# Patient Record
Sex: MALE | Race: WHITE | NOT HISPANIC OR LATINO | Employment: OTHER | URBAN - METROPOLITAN AREA
[De-identification: names, ages, dates, MRNs, and addresses within clinical notes are randomized per-mention and may not be internally consistent; named-entity substitution may affect disease eponyms.]

---

## 2022-11-22 ENCOUNTER — HOSPITAL ENCOUNTER (EMERGENCY)
Facility: CLINIC | Age: 67
Discharge: HOME | End: 2022-11-22
Attending: EMERGENCY MEDICINE | Admitting: EMERGENCY MEDICINE
Payer: MEDICARE

## 2022-11-22 ENCOUNTER — APPOINTMENT (OUTPATIENT)
Dept: GENERAL RADIOLOGY | Facility: CLINIC | Age: 67
End: 2022-11-22
Attending: EMERGENCY MEDICINE
Payer: MEDICARE

## 2022-11-22 VITALS
BODY MASS INDEX: 23.7 KG/M2 | TEMPERATURE: 98.8 F | SYSTOLIC BLOOD PRESSURE: 154 MMHG | HEART RATE: 97 BPM | OXYGEN SATURATION: 99 % | RESPIRATION RATE: 22 BRPM | HEIGHT: 72 IN | WEIGHT: 175 LBS | DIASTOLIC BLOOD PRESSURE: 84 MMHG

## 2022-11-22 DIAGNOSIS — S29.019A ACUTE THORACIC MYOFASCIAL STRAIN, INITIAL ENCOUNTER: ICD-10-CM

## 2022-11-22 DIAGNOSIS — D86.9 SARCOIDOSIS: ICD-10-CM

## 2022-11-22 DIAGNOSIS — J20.9 ACUTE BRONCHITIS, UNSPECIFIED ORGANISM: ICD-10-CM

## 2022-11-22 PROBLEM — J84.9 INTERSTITIAL LUNG DISEASE (H): Status: ACTIVE | Noted: 2019-05-03

## 2022-11-22 LAB
ALBUMIN SERPL-MCNC: 3.4 G/DL (ref 3.4–5)
ALP SERPL-CCNC: 64 U/L (ref 40–150)
ALT SERPL W P-5'-P-CCNC: 22 U/L (ref 0–70)
ANION GAP SERPL CALCULATED.3IONS-SCNC: 4 MMOL/L (ref 3–14)
AST SERPL W P-5'-P-CCNC: 20 U/L (ref 0–45)
BASOPHILS # BLD AUTO: 0 10E3/UL (ref 0–0.2)
BASOPHILS NFR BLD AUTO: 0 %
BILIRUB SERPL-MCNC: 0.6 MG/DL (ref 0.2–1.3)
BUN SERPL-MCNC: 28 MG/DL (ref 7–30)
CALCIUM SERPL-MCNC: 9.8 MG/DL (ref 8.5–10.1)
CHLORIDE BLD-SCNC: 100 MMOL/L (ref 94–109)
CO2 SERPL-SCNC: 28 MMOL/L (ref 20–32)
CREAT SERPL-MCNC: 0.9 MG/DL (ref 0.66–1.25)
D DIMER PPP FEU-MCNC: 0.5 UG/ML FEU (ref 0–0.5)
EOSINOPHIL # BLD AUTO: 0 10E3/UL (ref 0–0.7)
EOSINOPHIL NFR BLD AUTO: 0 %
ERYTHROCYTE [DISTWIDTH] IN BLOOD BY AUTOMATED COUNT: 12.8 % (ref 10–15)
FLUAV RNA SPEC QL NAA+PROBE: NEGATIVE
FLUBV RNA RESP QL NAA+PROBE: NEGATIVE
GFR SERPL CREATININE-BSD FRML MDRD: >90 ML/MIN/1.73M2
GLUCOSE BLD-MCNC: 111 MG/DL (ref 70–99)
HCT VFR BLD AUTO: 41.9 % (ref 40–53)
HGB BLD-MCNC: 13.8 G/DL (ref 13.3–17.7)
HOLD SPECIMEN: NORMAL
IMM GRANULOCYTES # BLD: 0.1 10E3/UL
IMM GRANULOCYTES NFR BLD: 1 %
LACTATE SERPL-SCNC: 0.6 MMOL/L (ref 0.7–2)
LYMPHOCYTES # BLD AUTO: 0.6 10E3/UL (ref 0.8–5.3)
LYMPHOCYTES NFR BLD AUTO: 6 %
MCH RBC QN AUTO: 28.4 PG (ref 26.5–33)
MCHC RBC AUTO-ENTMCNC: 32.9 G/DL (ref 31.5–36.5)
MCV RBC AUTO: 86 FL (ref 78–100)
MONOCYTES # BLD AUTO: 0.8 10E3/UL (ref 0–1.3)
MONOCYTES NFR BLD AUTO: 8 %
NEUTROPHILS # BLD AUTO: 8.1 10E3/UL (ref 1.6–8.3)
NEUTROPHILS NFR BLD AUTO: 85 %
NRBC # BLD AUTO: 0 10E3/UL
NRBC BLD AUTO-RTO: 0 /100
PLATELET # BLD AUTO: 267 10E3/UL (ref 150–450)
POTASSIUM BLD-SCNC: 4.1 MMOL/L (ref 3.4–5.3)
PROCALCITONIN SERPL-MCNC: <0.05 NG/ML
PROT SERPL-MCNC: 8.2 G/DL (ref 6.8–8.8)
RBC # BLD AUTO: 4.86 10E6/UL (ref 4.4–5.9)
RSV RNA SPEC NAA+PROBE: NEGATIVE
SARS-COV-2 RNA RESP QL NAA+PROBE: NEGATIVE
SODIUM SERPL-SCNC: 132 MMOL/L (ref 133–144)
WBC # BLD AUTO: 9.6 10E3/UL (ref 4–11)

## 2022-11-22 PROCEDURE — 71046 X-RAY EXAM CHEST 2 VIEWS: CPT

## 2022-11-22 PROCEDURE — 36415 COLL VENOUS BLD VENIPUNCTURE: CPT | Performed by: EMERGENCY MEDICINE

## 2022-11-22 PROCEDURE — 87637 SARSCOV2&INF A&B&RSV AMP PRB: CPT | Performed by: EMERGENCY MEDICINE

## 2022-11-22 PROCEDURE — 99284 EMERGENCY DEPT VISIT MOD MDM: CPT | Mod: CS,25

## 2022-11-22 PROCEDURE — 85025 COMPLETE CBC W/AUTO DIFF WBC: CPT | Performed by: EMERGENCY MEDICINE

## 2022-11-22 PROCEDURE — 94640 AIRWAY INHALATION TREATMENT: CPT

## 2022-11-22 PROCEDURE — 83605 ASSAY OF LACTIC ACID: CPT | Performed by: EMERGENCY MEDICINE

## 2022-11-22 PROCEDURE — 250N000012 HC RX MED GY IP 250 OP 636 PS 637: Performed by: EMERGENCY MEDICINE

## 2022-11-22 PROCEDURE — 84145 PROCALCITONIN (PCT): CPT | Performed by: EMERGENCY MEDICINE

## 2022-11-22 PROCEDURE — 250N000009 HC RX 250: Performed by: EMERGENCY MEDICINE

## 2022-11-22 PROCEDURE — C9803 HOPD COVID-19 SPEC COLLECT: HCPCS

## 2022-11-22 PROCEDURE — 80053 COMPREHEN METABOLIC PANEL: CPT | Performed by: EMERGENCY MEDICINE

## 2022-11-22 PROCEDURE — 85379 FIBRIN DEGRADATION QUANT: CPT | Performed by: EMERGENCY MEDICINE

## 2022-11-22 RX ORDER — PREDNISONE 20 MG/1
40 TABLET ORAL ONCE
Status: COMPLETED | OUTPATIENT
Start: 2022-11-22 | End: 2022-11-22

## 2022-11-22 RX ORDER — CEPHALEXIN 500 MG/1
500 CAPSULE ORAL 4 TIMES DAILY
Qty: 40 CAPSULE | Refills: 0 | Status: SHIPPED | OUTPATIENT
Start: 2022-11-22 | End: 2022-12-02

## 2022-11-22 RX ORDER — FLUTICASONE FUROATE AND VILANTEROL 200; 25 UG/1; UG/1
1 POWDER RESPIRATORY (INHALATION) DAILY
COMMUNITY
Start: 2022-09-21

## 2022-11-22 RX ORDER — ATORVASTATIN CALCIUM 20 MG/1
20 TABLET, FILM COATED ORAL DAILY
COMMUNITY
Start: 2022-04-12 | End: 2023-04-12

## 2022-11-22 RX ORDER — HYDROXYCHLOROQUINE SULFATE 200 MG/1
200 TABLET, FILM COATED ORAL
COMMUNITY
Start: 2022-04-01

## 2022-11-22 RX ORDER — TACROLIMUS 1 MG/G
OINTMENT TOPICAL 2 TIMES DAILY
COMMUNITY
Start: 2021-04-27

## 2022-11-22 RX ORDER — PREDNISONE 20 MG/1
TABLET ORAL
Qty: 10 TABLET | Refills: 0 | Status: SHIPPED | OUTPATIENT
Start: 2022-11-22

## 2022-11-22 RX ORDER — FOLIC ACID 1 MG/1
1 TABLET ORAL
COMMUNITY
Start: 2021-05-12

## 2022-11-22 RX ORDER — IPRATROPIUM BROMIDE AND ALBUTEROL SULFATE 2.5; .5 MG/3ML; MG/3ML
3 SOLUTION RESPIRATORY (INHALATION) ONCE
Status: COMPLETED | OUTPATIENT
Start: 2022-11-22 | End: 2022-11-22

## 2022-11-22 RX ADMIN — PREDNISONE 40 MG: 20 TABLET ORAL at 21:36

## 2022-11-22 RX ADMIN — IPRATROPIUM BROMIDE AND ALBUTEROL SULFATE 3 ML: .5; 3 SOLUTION RESPIRATORY (INHALATION) at 21:37

## 2022-11-22 ASSESSMENT — ENCOUNTER SYMPTOMS
FATIGUE: 1
CHILLS: 1
NAUSEA: 0
APPETITE CHANGE: 1
FEVER: 1
ABDOMINAL PAIN: 0
COUGH: 1
SHORTNESS OF BREATH: 1
DIARRHEA: 0
VOMITING: 0
MYALGIAS: 1

## 2022-11-22 ASSESSMENT — ACTIVITIES OF DAILY LIVING (ADL)
ADLS_ACUITY_SCORE: 35
ADLS_ACUITY_SCORE: 35

## 2022-11-23 NOTE — DISCHARGE INSTRUCTIONS
Prednisone for 5 more days, push fluids, rest.  Keflex 4 times a day for 10 days.  Contact your pulmonary doctor tomorrow and schedule appointment within the next week for a recheck.  Talk to your regular doctor about the possibility that you may have an underlying penicillin allergy.  If you get significantly worse at any time with your oxygen level dropping and worsening shortness of breath and fevers, return to the emergency room.  Ice or heat to your back, consider seeing a chiropractor for the pain.  You could do massage as well.

## 2022-11-23 NOTE — ED PROVIDER NOTES
History   Chief Complaint:  Shortness of Breath    The history is provided by the patient.      Nicho Amezcua is a 67 year old male with history of sarcoidosis, emphysema, and hyperlipidemia who presents with shortness of breath. The patient reports onset of cough, shortness of breath, chills, fever, myalgia, fatigue, and loss of appetite 9 days ago. Adds that she has had pain in her right upper back for one week. Notes that his fevers peaked at 102 F and that he occasionally produces a white, chunky phlegm with is cough. Adds that he has to take breaks going up a flight of stairs. Denies use of at home oxygen or an albuterol inhaler. Of note, he reports his at home oxygen levels have hit the low 90's throughout the past week. Denies nausea, vomiting, diarrhea, abdominal pain, chest pain, and pain with respiration. Denies history of blood clot.    Review of Systems   Constitutional: Positive for appetite change, chills, fatigue and fever.   Respiratory: Positive for cough and shortness of breath.    Cardiovascular: Negative for chest pain.   Gastrointestinal: Negative for abdominal pain, diarrhea, nausea and vomiting.   Musculoskeletal: Positive for myalgias.   All other systems reviewed and are negative.      Allergies:  Sulfa Drugs  Tetracycline    Medications:  Atorvastatin  Fluticasone  Hydrochloroquine    Methotrexate     Past Medical History:     Interstitial lung disease  ADD  Hernia, inguinal  Hyperlipidemia  Sarcoidosis  Subclinical hypothyroidism  Wrist fracture  Erectile dysfunction  Emphysema     Past Surgical History:    Repair inguinal hernia, bilateral    Social History:  Presents alone  Presents via private vehicle     Physical Exam     Patient Vitals for the past 24 hrs:   BP Temp Temp src Pulse Resp SpO2 Height Weight   11/22/22 2010 (!) 154/84 -- -- 97 -- -- -- --   11/22/22 2007 -- 98.8  F (37.1  C) Oral -- 22 99 % 1.829 m (6') 79.4 kg (175 lb)     Physical Exam  Nursing note and vitals  reviewed.    Constitutional:  Appears comfortable.    HENT:                Nose normal.  No discharge.      Oral mucosa is moist.  Eyes:    Conjunctivae are normal without injection.  Pupils are equal.  Cardiovascular:  Normal rate, regular rhythm with normal S1 and S2.      Normal heart sounds and peripheral pulses 2+ and equal.       No murmur or amrita.  Pulmonary:  Respiratory effort increased slightly. Few coarse bronchi bilaterally.    No wheezing noted, he coughs a lot when he takes a deep breath.  GI:    Soft. No distension and no mass. No tenderness.      No flank pain.   Musculoskeletal: Some tenderness to the right thoracic spine on palpation over the rhomboid muscle.  No midline spinal tenderness.  Skin:    Skin is warm and dry. No rash noted.   Psychiatric:   Behavior is normal. Appropriate mood and affect.     Judgment and thought content normal.    Emergency Department Course   Imaging:  Chest XR,  PA & LAT   Final Result   IMPRESSION: There is bilateral hilar prominence, which may reflect lymphadenopathy in the setting of reported sarcoidosis. Bilateral perihilar interstitial opacities are likely related.      Within the left lower lobe, there is a more solid region of airspace opacity, measuring up to 6.5 cm, which could be associated patients known sarcoidosis, though a primary pulmonary malignancy or acute airspace consolidation cannot be excluded.    Correlation with CT imaging is recommended.      No pleural effusion or pneumothorax.       Cardiomediastinal silhouette is normal.         ATTENTION: ABNORMAL REPORT      THE DICTATION ABOVE DESCRIBES AN ABNORMALITY FOR WHICH FOLLOWUP IS NEEDED.        Report per radiology    Laboratory:  Labs Ordered and Resulted from Time of ED Arrival to Time of ED Departure   COMPREHENSIVE METABOLIC PANEL - Abnormal       Result Value    Sodium 132 (*)     Potassium 4.1      Chloride 100      Carbon Dioxide (CO2) 28      Anion Gap 4      Urea Nitrogen 28       Creatinine 0.90      Calcium 9.8      Glucose 111 (*)     Alkaline Phosphatase 64      AST 20      ALT 22      Protein Total 8.2      Albumin 3.4      Bilirubin Total 0.6      GFR Estimate >90     LACTIC ACID WHOLE BLOOD - Abnormal    Lactic Acid 0.6 (*)    CBC WITH PLATELETS AND DIFFERENTIAL - Abnormal    WBC Count 9.6      RBC Count 4.86      Hemoglobin 13.8      Hematocrit 41.9      MCV 86      MCH 28.4      MCHC 32.9      RDW 12.8      Platelet Count 267      % Neutrophils 85      % Lymphocytes 6      % Monocytes 8      % Eosinophils 0      % Basophils 0      % Immature Granulocytes 1      NRBCs per 100 WBC 0      Absolute Neutrophils 8.1      Absolute Lymphocytes 0.6 (*)     Absolute Monocytes 0.8      Absolute Eosinophils 0.0      Absolute Basophils 0.0      Absolute Immature Granulocytes 0.1      Absolute NRBCs 0.0     INFLUENZA A/B & SARS-COV2 PCR MULTIPLEX - Normal    Influenza A PCR Negative      Influenza B PCR Negative      RSV PCR Negative      SARS CoV2 PCR Negative     PROCALCITONIN - Normal    Procalcitonin <0.05     D DIMER QUANTITATIVE - Normal    D-Dimer Quantitative 0.50            Emergency Department Course:  Reviewed:  I reviewed nursing notes, vitals, past medical history and Care Everywhere    Assessments:  2107 I obtained history and examined the patient as noted above.   2119 I rechecked and updated the patient.     Interventions:  2136 Prednisone, 40 mg, PO  2137 DuoNeb, 3 mL, Nebulization    Disposition:  The patient was discharged to home.     Impression & Plan   Medical Decision Making:  Patient with known sarcoid comes in with cough and low-grade temps over the past 9 days or so.  He has had some body aches as well, influenza and COVID are negative.  His oxygen level is good, lactic acid normal 0.6, white count is normal, procalcitonin is normal and his comprehensive panel is normal other than a mildly reduced sodium of 132.  Because of the pain next to his mid thoracic spine, I did  get a D-dimer and that is normal.  Chest x-ray shows sarcoid lung and there was a question of possibly a left infiltrate versus sarcoid.  With a normal procalcitonin I think it is probably sarcoid.  I did not have an old chest x-ray to compare to that was recent.  However his phlegm has become colored and with the low-grade fevers and persistent cough, I gave him 40 of prednisone orally and will start him on Keflex for a bacterial bronchitis.  The nebulizer did not really help him at all.  Chely put him on 5 more days of prednisone he is going to contact his pulmonary doctor tomorrow for follow-up within the next week.  He was instructed to return to the ER if he gets worse with low sats.  His oxygen level here was 99% on room air.  He is taking over-the-counter cough syrup at night which seems to help.  I did recommend for his myofascial strain in his back that he can see his chiropractor.      Prednisone for 5 more days, push fluids, rest.  Keflex 4 times a day for 10 days.  Contact your pulmonary doctor tomorrow and schedule appointment within the next week for a recheck.  Talk to your regular doctor about the possibility that you may have an underlying penicillin allergy.  If you get significantly worse at any time with your oxygen level dropping and worsening shortness of breath and fevers, return to the emergency room.  Ice or heat to your back, consider seeing a chiropractor for the pain.  You could do massage as well.    Diagnosis:    ICD-10-CM    1. Acute bronchitis, unspecified organism  J20.9       2. Sarcoidosis  D86.9       3. Acute thoracic myofascial strain, initial encounter  S29.019A           Discharge Medications:  New Prescriptions    CEPHALEXIN (KEFLEX) 500 MG CAPSULE    Take 1 capsule (500 mg) by mouth 4 times daily for 10 days    PREDNISONE (DELTASONE) 20 MG TABLET    Take two tablets (= 40mg) each day for 5 (five) days     Scribe Disclosure:  I, Mely Méndez, am serving as a scribe at  8:54 PM on 11/22/2022 to document services personally performed by Elaina Guadarrama MD based on my observations and the provider's statements to me.      Elaina Guadarrama MD  11/22/22 [id number]

## 2024-03-04 ENCOUNTER — ANCILLARY PROCEDURE (OUTPATIENT)
Dept: GENERAL RADIOLOGY | Facility: CLINIC | Age: 69
End: 2024-03-04
Attending: PHYSICIAN ASSISTANT
Payer: MEDICARE

## 2024-03-04 ENCOUNTER — OFFICE VISIT (OUTPATIENT)
Dept: URGENT CARE | Facility: URGENT CARE | Age: 69
End: 2024-03-04
Payer: MEDICARE

## 2024-03-04 VITALS
TEMPERATURE: 100.4 F | OXYGEN SATURATION: 95 % | HEART RATE: 113 BPM | DIASTOLIC BLOOD PRESSURE: 70 MMHG | RESPIRATION RATE: 18 BRPM | SYSTOLIC BLOOD PRESSURE: 158 MMHG

## 2024-03-04 DIAGNOSIS — R50.9 FEVER AND CHILLS: ICD-10-CM

## 2024-03-04 DIAGNOSIS — J20.8 ACUTE BACTERIAL BRONCHITIS: Primary | ICD-10-CM

## 2024-03-04 DIAGNOSIS — B96.89 ACUTE BACTERIAL BRONCHITIS: Primary | ICD-10-CM

## 2024-03-04 LAB
FLUAV AG SPEC QL IA: NEGATIVE
FLUBV AG SPEC QL IA: NEGATIVE

## 2024-03-04 PROCEDURE — 99204 OFFICE O/P NEW MOD 45 MIN: CPT | Mod: 25 | Performed by: PHYSICIAN ASSISTANT

## 2024-03-04 PROCEDURE — 87635 SARS-COV-2 COVID-19 AMP PRB: CPT | Performed by: PHYSICIAN ASSISTANT

## 2024-03-04 PROCEDURE — 87804 INFLUENZA ASSAY W/OPTIC: CPT | Performed by: PHYSICIAN ASSISTANT

## 2024-03-04 PROCEDURE — 94640 AIRWAY INHALATION TREATMENT: CPT | Performed by: PHYSICIAN ASSISTANT

## 2024-03-04 PROCEDURE — 71046 X-RAY EXAM CHEST 2 VIEWS: CPT | Mod: TC | Performed by: RADIOLOGY

## 2024-03-04 RX ORDER — IPRATROPIUM BROMIDE AND ALBUTEROL SULFATE 2.5; .5 MG/3ML; MG/3ML
3 SOLUTION RESPIRATORY (INHALATION) ONCE
Status: COMPLETED | OUTPATIENT
Start: 2024-03-04 | End: 2024-03-04

## 2024-03-04 RX ORDER — ALBUTEROL SULFATE 90 UG/1
1-2 AEROSOL, METERED RESPIRATORY (INHALATION) EVERY 6 HOURS
Qty: 8.5 G | Refills: 0 | Status: SHIPPED | OUTPATIENT
Start: 2024-03-04 | End: 2024-03-09

## 2024-03-04 RX ORDER — SILDENAFIL CITRATE 20 MG/1
20 TABLET ORAL
COMMUNITY
Start: 2023-12-08 | End: 2024-12-07

## 2024-03-04 RX ORDER — CEFPODOXIME PROXETIL 200 MG/1
200 TABLET, FILM COATED ORAL 2 TIMES DAILY
Qty: 14 TABLET | Refills: 0 | Status: SHIPPED | OUTPATIENT
Start: 2024-03-04 | End: 2024-03-11

## 2024-03-04 RX ORDER — CLOBETASOL PROPIONATE 0.5 MG/G
1 OINTMENT TOPICAL
COMMUNITY
Start: 2023-11-06

## 2024-03-04 RX ORDER — TADALAFIL 20 MG/1
40 TABLET ORAL
COMMUNITY
Start: 2024-02-20 | End: 2025-02-19

## 2024-03-04 RX ORDER — PREDNISONE 20 MG/1
40 TABLET ORAL DAILY
Qty: 10 TABLET | Refills: 0 | Status: SHIPPED | OUTPATIENT
Start: 2024-03-04 | End: 2024-03-09

## 2024-03-04 RX ADMIN — IPRATROPIUM BROMIDE AND ALBUTEROL SULFATE 3 ML: 2.5; .5 SOLUTION RESPIRATORY (INHALATION) at 15:27

## 2024-03-04 NOTE — PROGRESS NOTES
URGENT CARE VISIT:    SUBJECTIVE:   Nicho Amezcua is a 68 year old male presenting with a chief complaint of fever, cough - productive, wheezing, and shortness of breath.  Onset was 4 day(s) ago.   He denies the following symptoms: sore throat, vomiting, and diarrhea  Course of illness is worsening.    Treatment measures tried include Tylenol/Ibuprofen with no relief of symptoms.  Predisposing factors include hx of pulmonary hypertension and sarcoidosis.    PMH: History reviewed. No pertinent past medical history.  Allergies: Amoxicillin, Sulfa antibiotics, and Tetracycline   Medications:   Current Outpatient Medications   Medication Sig Dispense Refill    albuterol (PROAIR HFA/PROVENTIL HFA/VENTOLIN HFA) 108 (90 Base) MCG/ACT inhaler Inhale 1-2 puffs into the lungs every 6 hours for 5 days 8.5 g 0    cefpodoxime (VANTIN) 200 MG tablet Take 1 tablet (200 mg) by mouth 2 times daily for 7 days 14 tablet 0    clobetasol (TEMOVATE) 0.05 % external ointment Apply 1 Application topically      fluticasone-vilanterol (BREO ELLIPTA) 200-25 MCG/ACT inhaler Inhale 1 puff into the lungs daily      folic acid (FOLVITE) 1 MG tablet Take 1 mg by mouth      hydroxychloroquine (PLAQUENIL) 200 MG tablet Take 200 mg by mouth      methotrexate 2.5 MG tablet Take 15 mg by mouth      predniSONE (DELTASONE) 20 MG tablet Take 2 tablets (40 mg) by mouth daily for 5 days 10 tablet 0    sildenafil (REVATIO) 20 MG tablet Take 20 mg by mouth      tacrolimus (PROTOPIC) 0.1 % external ointment Apply topically 2 times daily      tadalafil, PAH, 20 MG TABS Take 40 mg by mouth      atorvastatin (LIPITOR) 20 MG tablet Take 20 mg by mouth daily      predniSONE (DELTASONE) 20 MG tablet Take two tablets (= 40mg) each day for 5 (five) days (Patient not taking: Reported on 3/4/2024) 10 tablet 0     Social History:   Social History     Tobacco Use    Smoking status: Never    Smokeless tobacco: Never   Substance Use Topics    Alcohol use: Not on file        ROS:  Review of systems negative except as stated above.    OBJECTIVE:  BP (!) 158/70   Pulse 113   Temp 100.4  F (38  C) (Tympanic)   Resp 18   SpO2 95%   GENERAL APPEARANCE: healthy, alert and no distress  EYES: EOMI,  PERRL, conjunctiva clear  HENT: ear canals and TM's normal.  Nose and mouth without ulcers, erythema or lesions  NECK: supple, nontender, no lymphadenopathy  RESP: slight expiratory wheeze in lung bases  CV: regular rates and rhythm, normal S1 S2, no murmur noted  SKIN: no suspicious lesions or rashes    Labs:    Results for orders placed or performed in visit on 03/04/24   XR Chest 2 Views     Status: None    Narrative    CHEST TWO VIEWS 3/4/2024 3:25 PM     HISTORY: Fever and chills.    COMPARISON: November 22, 2022       Impression    IMPRESSION: Perihilar interstitial and airspace infiltrates are  increased from previous. No effusions. The cardiac silhouette is not  enlarged. Pulmonary vasculature is unremarkable.    HERMINIO CLEARY MD         SYSTEM ID:  [id number]   Results for orders placed or performed in visit on 03/04/24   Influenza A/B antigen     Status: Normal    Specimen: Nose; Swab   Result Value Ref Range    Influenza A antigen Negative Negative    Influenza B antigen Negative Negative    Narrative    Test results must be correlated with clinical data. If necessary, results should be confirmed by a molecular assay or viral culture.       ASSESSMENT:    ICD-10-CM    1. Acute bacterial bronchitis  J20.8 Symptomatic COVID-19 Virus (Coronavirus) by PCR Nose    B96.89 Influenza A/B antigen     XR Chest 2 Views     ipratropium - albuterol 0.5 mg/2.5 mg/3 mL (DUONEB) neb solution 3 mL     predniSONE (DELTASONE) 20 MG tablet     cefpodoxime (VANTIN) 200 MG tablet     albuterol (PROAIR HFA/PROVENTIL HFA/VENTOLIN HFA) 108 (90 Base) MCG/ACT inhaler          PLAN:  45 minutes spent by me on the date of the encounter doing chart review, review of outside records, review of test results,  interpretation of tests, patient visit, and documentation   Patient Instructions   Patient with history of sarcoidosis and pulmonary hypertension presents with fever, cough, and sob for 4 days. Chest x-ray was negative for pneumonia. I suspect viral URI. Flu is negative. COVID is pending. Given chronic lung conditions, will start him on cephalosporin given multiple allergies. He has placed on Cephalosporin in 2022 for similar symptoms which helped. Prednisone and albuterol also rx. Side effects were discussed. Conservative measures discussed including rest, increased fluids, humidifier, and teaspoon of honey. See your primary care provider if symptoms do not improve in 3 days. Seek emergency care if you develop shortness of breath at rest or fever over 105.    Patient verbalized understanding and is agreeable to plan. The patient was discharged ambulatory and in stable condition.    Amanda Myrick PA-C ....................  3/4/2024   4:23 PM

## 2024-03-04 NOTE — PATIENT INSTRUCTIONS
Patient with history of sarcoidosis and pulmonary hypertension presents with fever, cough, and sob for 4 days. Chest x-ray was negative for pneumonia. I suspect viral URI. Flu is negative. COVID is pending. Given chronic lung conditions, will start him on cephalosporin given multiple allergies. He has placed on Cephalosporin in 2022 for similar symptoms which helped. Prednisone and albuterol also rx. Side effects were discussed. Conservative measures discussed including rest, increased fluids, humidifier, and teaspoon of honey. See your primary care provider if symptoms do not improve in 3 days. Seek emergency care if you develop shortness of breath at rest or fever over 105.

## 2024-03-05 LAB — SARS-COV-2 RNA RESP QL NAA+PROBE: NEGATIVE

## 2024-04-07 ENCOUNTER — HEALTH MAINTENANCE LETTER (OUTPATIENT)
Age: 69
End: 2024-04-07

## 2024-04-08 ENCOUNTER — HOSPITAL ENCOUNTER (EMERGENCY)
Facility: CLINIC | Age: 69
Discharge: HOME | End: 2024-04-08
Attending: EMERGENCY MEDICINE | Admitting: EMERGENCY MEDICINE
Payer: MEDICARE

## 2024-04-08 ENCOUNTER — APPOINTMENT (OUTPATIENT)
Dept: CT IMAGING | Facility: CLINIC | Age: 69
End: 2024-04-08
Attending: EMERGENCY MEDICINE
Payer: MEDICARE

## 2024-04-08 ENCOUNTER — APPOINTMENT (OUTPATIENT)
Dept: GENERAL RADIOLOGY | Facility: CLINIC | Age: 69
End: 2024-04-08
Attending: EMERGENCY MEDICINE
Payer: MEDICARE

## 2024-04-08 VITALS
SYSTOLIC BLOOD PRESSURE: 167 MMHG | TEMPERATURE: 98.1 F | HEART RATE: 78 BPM | OXYGEN SATURATION: 97 % | DIASTOLIC BLOOD PRESSURE: 108 MMHG | RESPIRATION RATE: 18 BRPM

## 2024-04-08 DIAGNOSIS — I27.20 PULMONARY HYPERTENSION (H): ICD-10-CM

## 2024-04-08 DIAGNOSIS — D86.9 SARCOIDOSIS: ICD-10-CM

## 2024-04-08 DIAGNOSIS — R06.02 SHORTNESS OF BREATH: ICD-10-CM

## 2024-04-08 LAB
ANION GAP SERPL CALCULATED.3IONS-SCNC: 11 MMOL/L (ref 7–15)
ATRIAL RATE - MUSE: 74 BPM
BASOPHILS # BLD AUTO: 0 10E3/UL (ref 0–0.2)
BASOPHILS NFR BLD AUTO: 0 %
BUN SERPL-MCNC: 18.9 MG/DL (ref 8–23)
CALCIUM SERPL-MCNC: 10.1 MG/DL (ref 8.8–10.2)
CHLORIDE SERPL-SCNC: 103 MMOL/L (ref 98–107)
CREAT SERPL-MCNC: 0.92 MG/DL (ref 0.67–1.17)
D DIMER PPP FEU-MCNC: 0.44 UG/ML FEU (ref 0–0.5)
DEPRECATED HCO3 PLAS-SCNC: 24 MMOL/L (ref 22–29)
DIASTOLIC BLOOD PRESSURE - MUSE: NORMAL MMHG
EGFRCR SERPLBLD CKD-EPI 2021: 90 ML/MIN/1.73M2
EOSINOPHIL # BLD AUTO: 0.1 10E3/UL (ref 0–0.7)
EOSINOPHIL NFR BLD AUTO: 2 %
ERYTHROCYTE [DISTWIDTH] IN BLOOD BY AUTOMATED COUNT: 13.5 % (ref 10–15)
FLUAV RNA SPEC QL NAA+PROBE: NEGATIVE
FLUBV RNA RESP QL NAA+PROBE: NEGATIVE
GLUCOSE SERPL-MCNC: 99 MG/DL (ref 70–99)
HCT VFR BLD AUTO: 43.7 % (ref 40–53)
HGB BLD-MCNC: 14.5 G/DL (ref 13.3–17.7)
HOLD SPECIMEN: NORMAL
IMM GRANULOCYTES # BLD: 0 10E3/UL
IMM GRANULOCYTES NFR BLD: 0 %
INTERPRETATION ECG - MUSE: NORMAL
LYMPHOCYTES # BLD AUTO: 0.6 10E3/UL (ref 0.8–5.3)
LYMPHOCYTES NFR BLD AUTO: 10 %
MCH RBC QN AUTO: 28 PG (ref 26.5–33)
MCHC RBC AUTO-ENTMCNC: 33.2 G/DL (ref 31.5–36.5)
MCV RBC AUTO: 84 FL (ref 78–100)
MONOCYTES # BLD AUTO: 0.8 10E3/UL (ref 0–1.3)
MONOCYTES NFR BLD AUTO: 13 %
NEUTROPHILS # BLD AUTO: 4.6 10E3/UL (ref 1.6–8.3)
NEUTROPHILS NFR BLD AUTO: 75 %
NRBC # BLD AUTO: 0 10E3/UL
NRBC BLD AUTO-RTO: 0 /100
NT-PROBNP SERPL-MCNC: 387 PG/ML (ref 0–900)
P AXIS - MUSE: 58 DEGREES
PLATELET # BLD AUTO: 237 10E3/UL (ref 150–450)
POTASSIUM SERPL-SCNC: 4.8 MMOL/L (ref 3.4–5.3)
PR INTERVAL - MUSE: 146 MS
QRS DURATION - MUSE: 78 MS
QT - MUSE: 394 MS
QTC - MUSE: 437 MS
R AXIS - MUSE: 17 DEGREES
RBC # BLD AUTO: 5.18 10E6/UL (ref 4.4–5.9)
RSV RNA SPEC NAA+PROBE: NEGATIVE
SARS-COV-2 RNA RESP QL NAA+PROBE: NEGATIVE
SODIUM SERPL-SCNC: 138 MMOL/L (ref 135–145)
SYSTOLIC BLOOD PRESSURE - MUSE: NORMAL MMHG
T AXIS - MUSE: 43 DEGREES
TROPONIN T SERPL HS-MCNC: 6 NG/L
TROPONIN T SERPL HS-MCNC: 8 NG/L
VENTRICULAR RATE- MUSE: 74 BPM
WBC # BLD AUTO: 6.1 10E3/UL (ref 4–11)

## 2024-04-08 PROCEDURE — 250N000011 HC RX IP 250 OP 636: Performed by: EMERGENCY MEDICINE

## 2024-04-08 PROCEDURE — 93005 ELECTROCARDIOGRAM TRACING: CPT

## 2024-04-08 PROCEDURE — 71046 X-RAY EXAM CHEST 2 VIEWS: CPT

## 2024-04-08 PROCEDURE — 80048 BASIC METABOLIC PNL TOTAL CA: CPT | Performed by: EMERGENCY MEDICINE

## 2024-04-08 PROCEDURE — 250N000009 HC RX 250: Performed by: EMERGENCY MEDICINE

## 2024-04-08 PROCEDURE — 71275 CT ANGIOGRAPHY CHEST: CPT | Mod: MA

## 2024-04-08 PROCEDURE — 94640 AIRWAY INHALATION TREATMENT: CPT

## 2024-04-08 PROCEDURE — 36415 COLL VENOUS BLD VENIPUNCTURE: CPT | Performed by: EMERGENCY MEDICINE

## 2024-04-08 PROCEDURE — 87637 SARSCOV2&INF A&B&RSV AMP PRB: CPT | Performed by: EMERGENCY MEDICINE

## 2024-04-08 PROCEDURE — 83880 ASSAY OF NATRIURETIC PEPTIDE: CPT | Performed by: EMERGENCY MEDICINE

## 2024-04-08 PROCEDURE — 85379 FIBRIN DEGRADATION QUANT: CPT | Performed by: EMERGENCY MEDICINE

## 2024-04-08 PROCEDURE — 99285 EMERGENCY DEPT VISIT HI MDM: CPT | Mod: 25

## 2024-04-08 PROCEDURE — 84484 ASSAY OF TROPONIN QUANT: CPT | Performed by: EMERGENCY MEDICINE

## 2024-04-08 PROCEDURE — 85025 COMPLETE CBC W/AUTO DIFF WBC: CPT | Performed by: EMERGENCY MEDICINE

## 2024-04-08 RX ORDER — IOPAMIDOL 755 MG/ML
66 INJECTION, SOLUTION INTRAVASCULAR ONCE
Status: COMPLETED | OUTPATIENT
Start: 2024-04-08 | End: 2024-04-08

## 2024-04-08 RX ORDER — CEFPODOXIME PROXETIL 200 MG/1
200 TABLET, FILM COATED ORAL 2 TIMES DAILY
Qty: 14 TABLET | Refills: 0 | Status: SHIPPED | OUTPATIENT
Start: 2024-04-08 | End: 2024-04-15

## 2024-04-08 RX ORDER — IPRATROPIUM BROMIDE AND ALBUTEROL SULFATE 2.5; .5 MG/3ML; MG/3ML
3 SOLUTION RESPIRATORY (INHALATION) ONCE
Status: COMPLETED | OUTPATIENT
Start: 2024-04-08 | End: 2024-04-08

## 2024-04-08 RX ORDER — PREDNISONE 20 MG/1
TABLET ORAL
Qty: 10 TABLET | Refills: 0 | Status: SHIPPED | OUTPATIENT
Start: 2024-04-08

## 2024-04-08 RX ADMIN — IPRATROPIUM BROMIDE AND ALBUTEROL SULFATE 3 ML: .5; 3 SOLUTION RESPIRATORY (INHALATION) at 11:35

## 2024-04-08 RX ADMIN — IOPAMIDOL 66 ML: 755 INJECTION, SOLUTION INTRAVENOUS at 12:34

## 2024-04-08 RX ADMIN — SODIUM CHLORIDE 91 ML: 9 INJECTION, SOLUTION INTRAVENOUS at 12:34

## 2024-04-08 ASSESSMENT — ACTIVITIES OF DAILY LIVING (ADL)
ADLS_ACUITY_SCORE: 33
ADLS_ACUITY_SCORE: 35

## 2024-04-08 ASSESSMENT — COLUMBIA-SUICIDE SEVERITY RATING SCALE - C-SSRS
6. HAVE YOU EVER DONE ANYTHING, STARTED TO DO ANYTHING, OR PREPARED TO DO ANYTHING TO END YOUR LIFE?: NO
2. HAVE YOU ACTUALLY HAD ANY THOUGHTS OF KILLING YOURSELF IN THE PAST MONTH?: NO
1. IN THE PAST MONTH, HAVE YOU WISHED YOU WERE DEAD OR WISHED YOU COULD GO TO SLEEP AND NOT WAKE UP?: NO

## 2024-04-08 NOTE — ED TRIAGE NOTES
Pt reports he was snorkeling in [place] three weeks ago, had episode where he became SOB and had to get out of water quickly. Pt reports since this he has been experiencing SOB. HX Pulm HTN, and sarcoidosis.      Triage Assessment (Adult)       Row Name 04/08/24 0942          Respiratory WDL    Respiratory WDL rhythm/pattern     Rhythm/Pattern, Respiratory shortness of breath        Peripheral/Neurovascular WDL    Peripheral Neurovascular WDL WDL        Cognitive/Neuro/Behavioral WDL    Cognitive/Neuro/Behavioral WDL WDL

## 2024-04-08 NOTE — ED PROVIDER NOTES
History     Chief Complaint:  Shortness of Breath       HPI   Nicho Amezcua is a 68 year old male with history of pulmonary hypertension and sarcoidosis who presents to the ED with a family member for evaluation of shortness of breath. He reports having shortness of breath when he was snorkeling three weeks ago in [place]  which prompted him to get out of the water quickly and continued to have it the next day, He states having shortness of breath since then along with a dry cough and occasional wheezing. He reports losing the capacity to go up the stairs when he came home but mentions slight improvement. He went on a walk on flat surface and was still out of breath. Patient is not on steroids. No history of blood clots. No use of blood thinners. Patient flew to [place]. Denies fever.     Independent Historian:   None - Patient Only    Review of External Notes:   Outpatient visits with cardiology and telemedicine from [facility] from 2/20/2024 and 3/4/2024.  Urgent care visit from 3/4/2024    Medications:    Albuterol inhaler   Lipitor  Folvite  Plaquenil  Methotrexate  Revatio  Tadalafil     Past Medical History:    Interstitial lung disease  Attention deficit disorder  Inguinal hernia  Hyperlipidemia  Sarcoidosis   Subclinical hypothyroidism   Pulmonary hypertension    Past Surgical History:    Hernia repair   Vasectomy  Right heart catheterization  Exercise    Physical Exam   Patient Vitals for the past 24 hrs:   BP Temp Temp src Pulse Resp SpO2   04/08/24 1400 (!) 167/108 -- -- 78 -- 97 %   04/08/24 1230 (!) 150/75 -- -- 79 18 96 %   04/08/24 1130 (!) 171/95 -- -- 68 20 98 %   04/08/24 1117 (!) 167/104 -- -- 69 -- 97 %   04/08/24 0939 116/81 98.1  F (36.7  C) Temporal 91 20 95 %      Physical Exam  General: Patient is alert and normal appearing.  HEENT: Head atraumatic    Eyes: pupils equal and reactive. Conjunctiva clear   Nares: patent   Oropharynx: no lesions, uvula midline, no palatal draping, normal  voice, no trismus  Neck: Supple without lymphadenopathy, no meningismus  Chest: Heart regular rate and rhythm.   Lungs: Equal clear to auscultation with no wheeze or rales, no tachypnea, no significant conversational dyspnea  Abdomen: Soft, non tender, nondistended, normal bowel sounds  Back: No costovertebral angle tenderness, no midline C, T or L spine tenderness  Neuro: Grossly nonfocal, normal speech, strength equal bilaterally, CN 2-12 intact  Extremities: No deformities, equal radial and DP pulses. No clubbing, cyanosis.  No edema  Skin: Warm and dry with no rash.       Emergency Department Course   ECG  ECG results from 04/08/24   EKG 12-lead, tracing only     Value    Systolic Blood Pressure     Diastolic Blood Pressure     Ventricular Rate 74    Atrial Rate 74    MA Interval 146    QRS Duration 78        QTc 437    P Axis 58    R AXIS 17    T Axis 43    Interpretation ECG      Sinus rhythm  Normal ECG  When compared with 2/20/24, No significant change   Read at 1224       Imaging:  CT Chest Pulmonary Embolism w Contrast   Final Result   IMPRESSION:   1.  No pulmonary emboli.   2.  Large perihilar consolidative opacities and innumerable   noncalcified nodules throughout the lungs may be related to known   pulmonary sarcoidosis. Metastatic disease cannot be excluded.   Comparison with prior available CTs or a follow-up short interval   chest CT is recommended.   3.  Mediastinal and upper abdominal lymphadenopathy with few   calcifications, again likely related to sarcoidosis although   metastatic disease is not excluded.      PEYMAN PSRINGER MD            SYSTEM ID:  [id number]      Chest XR,  PA & LAT   Final Result   IMPRESSION: Since 3/4/2024, overall similar perihilar opacities, some   of which appear nodular, likely sequelae of reported sarcoidosis.   Superimposed infection and/or underlying mass/nodule would be   difficult to exclude. Consider correlation with outside   cross-sectional imaging or  follow-up CT. No significant pleural   effusion, or pneumothorax. Heart size is not enlarged.      JULIANA COFFEY MD            SYSTEM ID:  [id number]         Report per radiology    Laboratory:  Labs Ordered and Resulted from Time of ED Arrival to Time of ED Departure   CBC WITH PLATELETS AND DIFFERENTIAL - Abnormal       Result Value    WBC Count 6.1      RBC Count 5.18      Hemoglobin 14.5      Hematocrit 43.7      MCV 84      MCH 28.0      MCHC 33.2      RDW 13.5      Platelet Count 237      % Neutrophils 75      % Lymphocytes 10      % Monocytes 13      % Eosinophils 2      % Basophils 0      % Immature Granulocytes 0      NRBCs per 100 WBC 0      Absolute Neutrophils 4.6      Absolute Lymphocytes 0.6 (*)     Absolute Monocytes 0.8      Absolute Eosinophils 0.1      Absolute Basophils 0.0      Absolute Immature Granulocytes 0.0      Absolute NRBCs 0.0     BASIC METABOLIC PANEL - Normal    Sodium 138      Potassium 4.8      Chloride 103      Carbon Dioxide (CO2) 24      Anion Gap 11      Urea Nitrogen 18.9      Creatinine 0.92      GFR Estimate 90      Calcium 10.1      Glucose 99     INFLUENZA A/B, RSV, & SARS-COV2 PCR - Normal    Influenza A PCR Negative      Influenza B PCR Negative      RSV PCR Negative      SARS CoV2 PCR Negative     D DIMER QUANTITATIVE - Normal    D-Dimer Quantitative 0.44     NT PROBNP INPATIENT - Normal    N terminal Pro BNP Inpatient 387     TROPONIN T, HIGH SENSITIVITY - Normal    Troponin T, High Sensitivity 8     TROPONIN T, HIGH SENSITIVITY - Normal    Troponin T, High Sensitivity 6        Procedures   None    Emergency Department Course & Assessments:    Interventions:  Medications   ipratropium - albuterol 0.5 mg/2.5 mg/3 mL (DUONEB) neb solution 3 mL (3 mLs Nebulization $Given 4/8/24 1135)   iopamidol (ISOVUE-370) solution 66 mL (66 mLs Intravenous $Given 4/8/24 1234)   Saline (91 mLs Intravenous $Given 4/8/24 1234)      Independent Interpretation (X-rays, CTs, rhythm  strip):  Chest x-ray with no acute infiltrate or pneumothorax    Assessments/Consultations/Discussion of Management or Tests:  ED Course as of 04/08/24 1340   Mon Apr 08, 2024   1108 I obtained history and examined the patient as noted above.    1339 I prepared the patient to be discharged home.    1410 I updated the patient and discussed the plan for follow-up with his specialists at [facility] affecting care:   None    Disposition:  The patient was discharged.     Impression & Plan    CMS Diagnoses: None      MIPS (If applicable):  CT for PE was ordered because the patient is high risk for pulmonary embolism.  Patient with recent prolonged travel, ongoing shortness of breath and dizziness.  Although D-dimer is normal high risk for PE and needs CT chest to further evaluate his sarcoidosis.    Medical Decision Making:  Patient is a 68-year-old male with history of pulmonary hypertension, interstitial lung disease and sarcoidosis who presents the emergency department 3 weeks of increasing shortness of breath since being in [place] on a snorkeling trip.  He states he had an acute onset of shortness of breath while snorkeling and panic at the time and since then has been struggling with exertional shortness of breath.  Last night he went for a long walk and by the second mile was having to rest frequently.  No chest pain with these episodes.  EKG without acute ischemic changes and troponin and delta troponin are negative.  No evidence of MI.  Previous cardiac workups without coronary artery disease and my suspicion of acute coronary syndrome is quite low given his symptoms.  If continued exertional symptoms after follow-up with his doctors consideration for stress echo.  BNP is within normal limits and there is no sign of significant right heart strain or right heart failure to suggest pulmonary hypertension crisis.  Patient's O2 saturations have been 96 to 98% here.  He has no significant  increased work of breathing while at rest.  CT scan given his travel was done to rule out PE as well as acute infiltrate and was negative for pulmonary embolism.  There is large perihilar opacities and nodules related to pulmonary sarcoidosis.  She also significant mediastinal upper abdominal lymphadenopathy likely related to his sarcoidosis.  Given ongoing symptoms feel a trial of steroids and antibiotics is warranted.  This may be worsening of his pulmonary hypertension and medication management by his cardiologist can be done as outpatient.  Given the lack of any signs of heart failure, blood pressures within acceptable limits, no hypoxia and no significant increased work of breathing believe it is reasonable for discharge.  Request that they call the specialist today for follow-up.  Acute emergencies have been ruled out this time and I feel he needs further workup by his pulmonologist and cardiologist.  Patient understands the need for close follow-up.  Return precautions emergency department reviewed.    Diagnosis:    ICD-10-CM    1. Sarcoidosis  D86.9       2. Shortness of breath  R06.02          Discharge Medications:  New Prescriptions    CEFPODOXIME (VANTIN) 200 MG TABLET    Take 1 tablet (200 mg) by mouth 2 times daily for 7 days    PREDNISONE (DELTASONE) 20 MG TABLET    Take two tablets (= 40mg) each day for 5 (five) days      Scribe Disclosure:  Iris SANCHEZ, am serving as a scribe at 11:14 AM on 4/8/2024 to document services personally performed by Kayy Altamirano MD based on my observations and the provider's statements to me.   4/8/2024   Kayy Altamirano MD Neuner, Maria Bea, MD  04/08/24 7824

## 2025-04-19 ENCOUNTER — HEALTH MAINTENANCE LETTER (OUTPATIENT)
Age: 70
End: 2025-04-19